# Patient Record
Sex: MALE | Race: WHITE | NOT HISPANIC OR LATINO | Employment: OTHER | URBAN - METROPOLITAN AREA
[De-identification: names, ages, dates, MRNs, and addresses within clinical notes are randomized per-mention and may not be internally consistent; named-entity substitution may affect disease eponyms.]

---

## 2023-03-20 ENCOUNTER — TRANSFERRED RECORDS (OUTPATIENT)
Dept: HEALTH INFORMATION MANAGEMENT | Facility: CLINIC | Age: 48
End: 2023-03-20

## 2023-03-31 ENCOUNTER — TRANSFERRED RECORDS (OUTPATIENT)
Dept: HEALTH INFORMATION MANAGEMENT | Facility: CLINIC | Age: 48
End: 2023-03-31

## 2024-02-26 ENCOUNTER — REFERRAL (OUTPATIENT)
Dept: TRANSPLANT | Facility: CLINIC | Age: 49
End: 2024-02-26

## 2024-02-26 ENCOUNTER — TRANSFERRED RECORDS (OUTPATIENT)
Dept: HEALTH INFORMATION MANAGEMENT | Facility: CLINIC | Age: 49
End: 2024-02-26
Payer: COMMERCIAL

## 2024-02-26 ENCOUNTER — MEDICAL CORRESPONDENCE (OUTPATIENT)
Dept: HEALTH INFORMATION MANAGEMENT | Facility: CLINIC | Age: 49
End: 2024-02-26
Payer: COMMERCIAL

## 2024-02-26 DIAGNOSIS — N18.5 CHRONIC KIDNEY DISEASE, STAGE 5, KIDNEY FAILURE (H): Primary | ICD-10-CM

## 2024-02-26 NOTE — LETTER
3/13/24        Ben Manzanares  [address]  [facility] [address]        Dear Ben,    Thank you for your interest in the Transplant Center at [facility]. We look forward to being a part of your care team and assisting you through the transplant process.    As we discussed, your transplant coordinator is Reyna Leavitt (Pancreas, Kidney).  You may call your coordinator at any time with questions or concerns.  Your first scheduled call will be on 3/25/24 between 1:00 and 3:00.  If this needs to change, call [phone].    Please complete the following.    Fill out and return the enclosed forms  Authorization for Electronic Communication  Authorization to Discuss Protected Health Information  Authorization for Release of Protected Health Information  Authorization for Care Everywhere Release of Information    Sign up for:  [employer], access to your electronic medical record (see enclosed pamphlet)  [employer], a transplant education website                                          [facility] Transplant Place     You can use these tools to learn more about your transplant, communicate with your care team, and track your medical details      Sincerely,      Solid Organ Transplant  [facility]    cc: Care Team

## 2024-03-13 ENCOUNTER — DOCUMENTATION ONLY (OUTPATIENT)
Dept: TRANSPLANT | Facility: CLINIC | Age: 49
End: 2024-03-13
Payer: COMMERCIAL

## 2024-03-13 VITALS — BODY MASS INDEX: 27.2 KG/M2 | WEIGHT: 190 LBS | HEIGHT: 70 IN

## 2024-03-13 NOTE — TELEPHONE ENCOUNTER
PCP: Haresh Kapoor MD  Referring Organization: [facility]  Referring Provider: Carlos Russell NP  Referring Diagnosis: CKD, stage 5    GFR/Date: 15 (1/24/24)    Is patient under the age of 65? yes  Is patient diabetic? yes  Is patient on insulin? yes  Was patient offered a pancreas transplant referral? yes    Previous transplants: no  Cancer history: no  Cardiac history: CHF  Biopsy: no  Oxygen use at rest: no with activity: no    BMI: 27.26 (BMI> 40 - RNCC call only/ no PKE date)    Is patient in a group home/assisted living? no  Does patient have a guardian? no    Referral intake process completed.  Patient is aware that after financial approval is received, medical records will be requested.   Patient confirmed for a callback from transplant coordinator on 3/25/24. (within 2 weeks)  Tentative evaluation date 6/10/24 slot 1 (within 4 weeks) if appointment is virtual, does patient have capabilities of setting this up? N/A    Confirmed coordinator will discuss evaluation process in more detail at the time of their call.   Patient is aware of the need to arrange age appropriate cancer screening, vaccinations, and dental care.  Reminded patient to complete questionnaire, complete medical records release, and review packet prior to evaluation visit .  Assessed patient for special needs (ie-wheelchair, assistance, guardian, and ):  May need a wheelchair.   Patient instructed to call [phone] with questions.     Patient gave verbal consent during intake call to obtain medical records and documents outside of [facility]:  Yes

## 2024-03-27 ENCOUNTER — TEAM CONFERENCE (OUTPATIENT)
Dept: TRANSPLANT | Facility: CLINIC | Age: 49
End: 2024-03-27
Payer: COMMERCIAL

## 2024-03-27 NOTE — TELEPHONE ENCOUNTER
Nephrology Desk Rounds Meeting    ATTENDEES: Dr. Jenifer Mcgrath, Reyna Leavitt, Muriel Van      REVIEWED: Patient is a new referral from Carlos Russell from [facility] and I discussed my concerns regarding patients liver cirrhosis history and that he needs every 4-8 weeks paracentesis and does not follow with hepatology.     DECISION: patient needs to follow up with hepatology from [facility] or needs a hepatology referral internally before coming in for transplant evaluation as we do not have enough information on his liver status to come be evaluated for kidney transplant.

## 2024-03-28 ENCOUNTER — TELEPHONE (OUTPATIENT)
Dept: TRANSPLANT | Facility: CLINIC | Age: 49
End: 2024-03-28
Payer: COMMERCIAL

## 2024-03-28 NOTE — TELEPHONE ENCOUNTER
Called patient to let him know that I discussed his liver cirrhosis history with the transplant team and that we need more information and records in order to move forward with transplant evaluation. Requested that he make an appointment through [facility] and sign his docusign to get his last visit records as he is still requiring paracentesis' every 4-8 weeks. Patient verbalized understanding and I instructed him to call once he follows up with them to let us know so we can request the records.

## 2024-06-17 ENCOUNTER — TELEPHONE (OUTPATIENT)
Dept: TRANSPLANT | Facility: CLINIC | Age: 49
End: 2024-06-17
Payer: COMMERCIAL

## 2024-06-17 NOTE — TELEPHONE ENCOUNTER
Attempted to reach patient to see if he has had a liver biopsy or fibroscan completed yet with [facility]. I was unable to leave a message as voicemail was not set-up. Upon chart review, patient saw both his nephrologist and PCP at the end of April and he had not made an appointment yet to follow-up with GI. Both his providers had recommended he do so. Patient needs to follow-up with hepatology prior to coming in for PKE.

## 2024-07-29 ENCOUNTER — TELEPHONE (OUTPATIENT)
Dept: TRANSPLANT | Facility: CLINIC | Age: 49
End: 2024-07-29
Payer: COMMERCIAL

## 2024-07-29 NOTE — TELEPHONE ENCOUNTER
YESSENIA for patient introducing myself as his transplant coordinator and provided my direct number for him to return my call to provide an update in his GI evaluation prior to coming in for PKE.

## 2024-09-12 ENCOUNTER — TELEPHONE (OUTPATIENT)
Dept: TRANSPLANT | Facility: CLINIC | Age: 49
End: 2024-09-12
Payer: COMMERCIAL

## 2024-09-12 NOTE — TELEPHONE ENCOUNTER
LVM for patient to let him know we would be closing his referral at this time since we have been unable to reach him. Provided my direct number if he had any questions.